# Patient Record
Sex: MALE | Race: WHITE
[De-identification: names, ages, dates, MRNs, and addresses within clinical notes are randomized per-mention and may not be internally consistent; named-entity substitution may affect disease eponyms.]

---

## 2019-02-11 NOTE — EKG
[place] [address]
Phone:  [phone]                     ELECTROCARDIOGRAM REPORT      
_______________________________________________________________________________
 
Name:       SHORTY CINTRON            Room:                      [facility]#:  [id number]      Account #:      [id number]  
Admission:  02/10/19     Attend Phys:                         
Discharge:  02/10/19     Date of Birth:  [birth year]  
         Report #: [id number]
    [id number]
_______________________________________________________________________________
THIS REPORT FOR:  //name//                      
 
                         [facility] ED
                                       
Test Date:    2019-02-10               Test Time:    17:19:39
Pat Name:     SHORTY CINTRON            Department:   
Patient ID:   [id number]            Room:          
Gender:       M                        Technician:   ART
:          1975               Requested By: Michele Fitzpatrick
Order Number: [id number]    Erwin MD:   Ezekiel Garsia
                                 Measurements
Intervals                              Axis          
Rate:         106                      P:            40
KS:           109                      QRS:          14
QRSD:         99                       T:            42
QT:           340                                    
QTc:          452                                    
                           Interpretive Statements
Sinus tachycardia
Compared to ECG 10/18/2014 18:22:29
Sinus rhythm no longer present
 
Electronically Signed On 2019 14:59:43 CST by Ezekiel Garsia
[email or web address]
 
 
 
 
 
 
 
 
 
 
 
 
 
 
 
 
 
 
 
  <ELECTRONICALLY SIGNED>
                                           By: Ezekiel Garsia MD, [facility]      
  19     [id number]
D: 02/10/19 1719   _____________________________________
T: 02/10/19 1719   Ezekiel Garsia MD, FACC        /EPI

## 2019-07-25 ENCOUNTER — HOSPITAL ENCOUNTER (EMERGENCY)
Dept: HOSPITAL 96 - M.ERS | Age: 44
Discharge: HOME | End: 2019-07-25
Payer: COMMERCIAL

## 2019-07-25 VITALS — DIASTOLIC BLOOD PRESSURE: 88 MMHG | SYSTOLIC BLOOD PRESSURE: 143 MMHG

## 2019-07-25 VITALS — HEIGHT: 69 IN | WEIGHT: 164.99 LBS | BODY MASS INDEX: 24.44 KG/M2

## 2019-07-25 DIAGNOSIS — M54.9: ICD-10-CM

## 2019-07-25 DIAGNOSIS — Z88.0: ICD-10-CM

## 2019-07-25 DIAGNOSIS — G89.29: ICD-10-CM

## 2019-07-25 DIAGNOSIS — F11.10: Primary | ICD-10-CM
